# Patient Record
Sex: MALE | Race: WHITE | NOT HISPANIC OR LATINO | Employment: FULL TIME | URBAN - METROPOLITAN AREA
[De-identification: names, ages, dates, MRNs, and addresses within clinical notes are randomized per-mention and may not be internally consistent; named-entity substitution may affect disease eponyms.]

---

## 2020-07-10 ENCOUNTER — OFFICE VISIT (OUTPATIENT)
Dept: UROLOGY | Facility: CLINIC | Age: 55
End: 2020-07-10
Payer: COMMERCIAL

## 2020-07-10 VITALS
HEIGHT: 69 IN | SYSTOLIC BLOOD PRESSURE: 147 MMHG | DIASTOLIC BLOOD PRESSURE: 89 MMHG | HEART RATE: 72 BPM | BODY MASS INDEX: 25.14 KG/M2 | WEIGHT: 169.75 LBS

## 2020-07-10 DIAGNOSIS — N28.1 ACQUIRED RENAL CYST: Primary | ICD-10-CM

## 2020-07-10 PROCEDURE — 99203 OFFICE O/P NEW LOW 30 MIN: CPT | Mod: S$GLB,,, | Performed by: UROLOGY

## 2020-07-10 PROCEDURE — 99999 PR PBB SHADOW E&M-EST. PATIENT-LVL III: CPT | Mod: PBBFAC,,, | Performed by: UROLOGY

## 2020-07-10 PROCEDURE — 3008F PR BODY MASS INDEX (BMI) DOCUMENTED: ICD-10-PCS | Mod: CPTII,S$GLB,, | Performed by: UROLOGY

## 2020-07-10 PROCEDURE — 99999 PR PBB SHADOW E&M-EST. PATIENT-LVL III: ICD-10-PCS | Mod: PBBFAC,,, | Performed by: UROLOGY

## 2020-07-10 PROCEDURE — 3008F BODY MASS INDEX DOCD: CPT | Mod: CPTII,S$GLB,, | Performed by: UROLOGY

## 2020-07-10 PROCEDURE — 99203 PR OFFICE/OUTPT VISIT, NEW, LEVL III, 30-44 MIN: ICD-10-PCS | Mod: S$GLB,,, | Performed by: UROLOGY

## 2020-07-10 NOTE — PROGRESS NOTES
Subjective:       Patient ID: Dawit Rizo is a 55 y.o. male.    Chief Complaint: No chief complaint on file.    HPI     55-year-old here for evaluation of a renal cyst.  He had a recent abdominal ultrasound due to epigastric pain.  The ultrasound showed a small benign appearing cyst on the right kidney measuring about 2-1/2 cm.  He says this cyst has been demonstrated on previous imaging and has been mostly unchanged.  He has no flank pain.  He denies any history of kidney stones or kidney infections.    PSA, SCREEN (ng/mL)   Date Value   06/10/2020 1.1       Review of Systems   Constitutional: Negative for fever.   Eyes: Negative for visual disturbance.   Respiratory: Negative for shortness of breath.    Cardiovascular: Negative for chest pain.   Gastrointestinal: Negative for nausea.   Genitourinary: Negative for dysuria and hematuria.   Musculoskeletal: Negative for gait problem.   Skin: Negative for rash.   Neurological: Negative for seizures.   Psychiatric/Behavioral: Negative for confusion.       Objective:      Physical Exam  Vitals signs reviewed.   Constitutional:       Appearance: He is well-developed.   HENT:      Head: Normocephalic and atraumatic.   Eyes:      Conjunctiva/sclera: Conjunctivae normal.   Cardiovascular:      Rate and Rhythm: Normal rate.   Pulmonary:      Effort: Pulmonary effort is normal.   Abdominal:      General: There is no distension.      Tenderness: There is no right CVA tenderness or left CVA tenderness.   Musculoskeletal: Normal range of motion.   Skin:     General: Skin is warm and dry.      Findings: No rash.   Neurological:      Mental Status: He is alert and oriented to person, place, and time.         Assessment:       1. Acquired renal cyst        Plan:       Acquired renal cyst      reassurance.  He has a benign renal cyst.  No treatment recommended.  Follow-up as needed

## 2020-07-10 NOTE — LETTER
July 10, 2020      Rubin Arana MD  [address]  [facility] [address]           [place] [facility]  [address] [facility] [address]  Phone: [phone]  Fax: [phone]          Patient: Dawit Rizo   MR Number: [id number]   YOB: 1965   Date of Visit: 7/10/2020       Dear Dr. Rubin Arana:    Thank you for referring Dawit Rizo to me for evaluation. Attached you will find relevant portions of my assessment and plan of care.    If you have questions, please do not hesitate to call me. I look forward to following Dawit Rizo along with you.    Sincerely,    KOKI Pepe MD    Enclosure  CC:  No Recipients    If you would like to receive this communication electronically, please contact [email or web address] or [phone] to request more information on [employer] Link access.    For providers and/or their staff who would like to refer a patient to [facility], please contact us through our one-stop-shop provider referral line, [facility], at [phone].    If you feel you have received this communication in error or would no longer like to receive these types of communications, please e-mail [email or web address]

## 2022-01-18 ENCOUNTER — PATIENT MESSAGE (OUTPATIENT)
Dept: PSYCHIATRY | Facility: CLINIC | Age: 57
End: 2022-01-18
Payer: COMMERCIAL

## 2022-01-21 ENCOUNTER — PATIENT MESSAGE (OUTPATIENT)
Dept: PSYCHIATRY | Facility: CLINIC | Age: 57
End: 2022-01-21
Payer: COMMERCIAL

## 2022-01-24 ENCOUNTER — OFFICE VISIT (OUTPATIENT)
Dept: PSYCHIATRY | Facility: CLINIC | Age: 57
End: 2022-01-24
Payer: COMMERCIAL

## 2022-01-24 ENCOUNTER — PATIENT MESSAGE (OUTPATIENT)
Dept: PSYCHIATRY | Facility: CLINIC | Age: 57
End: 2022-01-24

## 2022-01-24 DIAGNOSIS — F41.1 GAD (GENERALIZED ANXIETY DISORDER): Primary | ICD-10-CM

## 2022-01-24 PROCEDURE — 90791 PR PSYCHIATRIC DIAGNOSTIC EVALUATION: ICD-10-PCS | Mod: 95,,, | Performed by: CASE MANAGER/CARE COORDINATOR

## 2022-01-24 PROCEDURE — 4010F PR ACE/ARB THEARPY RXD/TAKEN: ICD-10-PCS | Mod: CPTII,95,, | Performed by: CASE MANAGER/CARE COORDINATOR

## 2022-01-24 PROCEDURE — 3044F HG A1C LEVEL LT 7.0%: CPT | Mod: CPTII,95,, | Performed by: CASE MANAGER/CARE COORDINATOR

## 2022-01-24 PROCEDURE — 4010F ACE/ARB THERAPY RXD/TAKEN: CPT | Mod: CPTII,95,, | Performed by: CASE MANAGER/CARE COORDINATOR

## 2022-01-24 PROCEDURE — 90791 PSYCH DIAGNOSTIC EVALUATION: CPT | Mod: 95,,, | Performed by: CASE MANAGER/CARE COORDINATOR

## 2022-01-24 PROCEDURE — 3044F PR MOST RECENT HEMOGLOBIN A1C LEVEL <7.0%: ICD-10-PCS | Mod: CPTII,95,, | Performed by: CASE MANAGER/CARE COORDINATOR
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
## 2022-02-14 ENCOUNTER — OFFICE VISIT (OUTPATIENT)
Dept: PSYCHIATRY | Facility: CLINIC | Age: 57
End: 2022-02-14
Payer: COMMERCIAL

## 2022-02-14 DIAGNOSIS — F32.89 OTHER DEPRESSION: ICD-10-CM

## 2022-02-14 DIAGNOSIS — F41.1 GAD (GENERALIZED ANXIETY DISORDER): Primary | ICD-10-CM

## 2022-02-14 PROCEDURE — 4010F ACE/ARB THERAPY RXD/TAKEN: CPT | Mod: CPTII,95,, | Performed by: CASE MANAGER/CARE COORDINATOR

## 2022-02-14 PROCEDURE — 3044F HG A1C LEVEL LT 7.0%: CPT | Mod: CPTII,95,, | Performed by: CASE MANAGER/CARE COORDINATOR

## 2022-02-14 PROCEDURE — 90834 PSYTX W PT 45 MINUTES: CPT | Mod: 95,,, | Performed by: CASE MANAGER/CARE COORDINATOR

## 2022-02-14 PROCEDURE — 90834 PR PSYCHOTHERAPY W/PATIENT, 45 MIN: ICD-10-PCS | Mod: 95,,, | Performed by: CASE MANAGER/CARE COORDINATOR

## 2022-02-14 PROCEDURE — 3044F PR MOST RECENT HEMOGLOBIN A1C LEVEL <7.0%: ICD-10-PCS | Mod: CPTII,95,, | Performed by: CASE MANAGER/CARE COORDINATOR

## 2022-02-14 PROCEDURE — 4010F PR ACE/ARB THEARPY RXD/TAKEN: ICD-10-PCS | Mod: CPTII,95,, | Performed by: CASE MANAGER/CARE COORDINATOR

## 2022-02-14 NOTE — PROGRESS NOTES
"Primary Care Behavioral Health: Follow-up  CPT Code: 35019  Patient Name: Dawit Rizo  Date:  2/14/2022   Site:  [facility]  Referral source: Marcela Palacios NP    The patient location is:  [address] [place] [address]   [place] [address]  The patient location [place] is: [facility]  The patient phone number is: [phone]  Visit type: Virtual visit with synchronous audio and video  Each patient to whom he or she provides medical services by telemedicine is:  (1) informed of the relationship between the provider and patient and the respective role of any other health care provider with respect to management of the patient; and (2) notified that he or she may decline to receive medical services by telemedicine and may withdraw from such care at any time.    Chief complaint/reason for encounter: anxiety and depression     History of present illness:  Mr. Dawit Rizo is a 56 y.o. Not  or /a male referred by Marcela Palacios NP.  Patient was seen, examined and chart was reviewed.  Dawit Rizo reviewed and agreed to informed consent and the limits of confidentiality.  Patient shared that his son committed suicide in 2014. Patient noted that he has felt "stuck in the past" at times. He noted symptoms of anxiety and depression including decreased self-esteem and racing thoughts. He stated his symptoms are worse when he is at home. He acknowledged that he had not been exercising as recommended. He also acknowledged that he practiced the progressive muscle relaxation exercise only couple of days since last session. Patient acknowledged that he has had passive suicidal thoughts but denied any plan or intent. He denied any current suicidal thoughts. He acknowledged that he would call 911 or present to the ED if he had suicidal thoughts in the future. Patient shared that much of his stress is related to work and his mortgage payments. Patient noted that he attempted to look at " family pictures but stated it does not help as looking at pictures of his son makes him sad. He did state that he had a good day when he drove into [employer] and got to see some people he used to work with. He also stated he believes his prescription of Wellbutrin is helping decrease his depression.      Past Medical History:   Diagnosis Date    Chicken pox     Depression     GERD (gastroesophageal reflux disease)          Current Outpatient Medications:     ascorbic acid, vitamin C, (VITAMIN C) 100 MG tablet, Take 100 mg by mouth once daily., Disp: , Rfl:     buPROPion (WELLBUTRIN XL) 150 MG TB24 tablet, Take 1 tablet (150 mg total) by mouth once daily., Disp: 30 tablet, Rfl: 11    fluticasone propionate (FLONASE) 50 mcg/actuation nasal spray, 1 spray (50 mcg total) by Each Nostril route once daily., Disp: 11.1 mL, Rfl: 3    lisinopriL 10 MG tablet, Take 1 tablet (10 mg total) by mouth once daily., Disp: 90 tablet, Rfl: 3    loratadine 10 mg Cap, once daily., Disp: , Rfl:     metoprolol tartrate (LOPRESSOR) 25 MG tablet, Take 1 tablet (25 mg total) by mouth 2 (two) times daily. Take one pill the night prior to your CTA and the second pill the morning of your CTA for 2 doses, Disp: 2 tablet, Rfl: 0    multivitamin (THERAGRAN) per tablet, Take 1 tablet by mouth once daily., Disp: , Rfl:     mupirocin (BACTROBAN) 2 % ointment, APPLY SMALL AMOUNT TOPICALLY TO THE AFFECTED AREA TWICE DAILY FOR 5 TO 7 DAYS AS NEEDED, Disp: , Rfl:     pantoprazole (PROTONIX) 40 MG tablet, TK 1 T PO QD B A MEAL FOR 30 DAYS., Disp: , Rfl:     rosuvastatin (CRESTOR) 10 MG tablet, Take 1 tablet (10 mg total) by mouth once daily., Disp: 90 tablet, Rfl: 3    UNABLE TO FIND, Moringa powder (Multivitamins), Disp: , Rfl:     vitamin E 100 UNIT capsule, Take 100 Units by mouth once daily., Disp: , Rfl:       Psychiatric symptoms:  Depression:  Patient reported that he felt sad for at least half of the day 7 days in the past two weeks;  "also reported decreased self-esteem and feeling at times "stuck in the past"  Suma/Hypomania:  Denied.  He denied periods of elevated mood or abnormally increased energy or goal-directed activity.  Anxiety:  Reported stress about work and paying his home mortgage. Racing thoughts. Felt anxious 14 out of last 14 days  Thoughts:  Denied delusions, hallucinations.  Suicidal thoughts/behaviors:  Patient denied suicidal and homicidal ideation, plan and intent.  Patient noted agreement to call 911/and or present to the ED if he experienced suicidal or homicidal ideation, plan or intent. He acknowledge some previous passive suicidal thoughts. He noted that these were brief and that he never had a plan or intent.  Self-injury:  Denied.  Sleep: Patient noted that he has no difficulty falling asleep but acknowledged that he has a couple glass of wine in the evenings; acknowledged he wants to decrease this; reportedly sleeps 7 hours per night  Trauma: Son committed suicide in 2014      Mental Status Exam:  General appearance:  appears stated age, neatly dressed, well groomed  Speech:  normal rate, normal tone, normal pitch, normal volume  Level of cooperation:  cooperative  Thought processes:  logical, goal-directed  Mood:  Anxious  Thought content:  no illusions, no visual hallucinations, no auditory hallucinations, no delusions, no active or passive homicidal thoughts, no active or passive suicidal ideation at this time, no obsessions, no compulsions, no violence  Affect:  Appropriate and mood congruent; appeared anxious at times during the session  Orientation:  oriented to person, place, situation and date  Memory/Attention and Concentration:  No gross deficits made evident during conversation.  Judgment and insight: fair  Language:  intact    Over the last 2 weeks, how often have you been bothered by any of the following problems?  Little interest or pleasure in doing things: More than half the days  Feeling down, " depressed, or hopeless: More than half the days  Trouble falling or staying asleep, or sleeping too much: Several days  Feeling tired or having little energy: More than half the days  Poor appetite or overeating: Not at all  Feeling bad about yourself - or that you are a failure or have let yourself or your family down: Nearly every day  Trouble concentrating on things, such as reading the newspaper or watching television: Several days  Moving or speaking so slowly that other people could have noticed. Or the opposite - being so fidgety or restless that you have been moving around a lot more than usual: Not at all  Thoughts that you would be better off dead, or of hurting yourself in some way: More than half the days  PHQ-9 Total Score: 13  If you checked off any problems, how difficult have these problems made it for you to do your work, take care of things at home, or get along with other people?: Very difficult    Past PHQ-9 Score: 17 (1/24/2022)    GAD7 2/14/2022 1/24/2022   1. Feeling nervous, anxious, or on edge? 3 3   2. Not being able to stop or control worrying? 3 3   3. Worrying too much about different things? 3 3   4. Trouble relaxing? 3 3   5. Being so restless that it is hard to sit still? 2 2   6. Becoming easily annoyed or irritable? 1 1   7. Feeling afraid as if something awful might happen? 2 2   8. If you checked off any problems, how difficult have these problems made it for you to do your work, take care of things at home, or get along with other people? 2 2   SHANITA-7 Score 17 17       Impression:    Patient appears to present with both symptoms of depression and anxiety. It appears that his current anxiety focuses on thoughts about work and his house. Discussed practicing skills at home such as progressive muscle relaxation. Patient stated he would work on doing this more. Patient also shared that he would put together a daily schedule for himself including time to exercise. Discussed the  cognitive triad with patient including how taking part in pleasant events can help change thoughts and feelings to more positive ones. Patient verbally was able to reiterate the concept suggesting he understood it. Patient was recommended to include some pleasant activities into his schedule. Discussed with patient referral to long term psychotherapy. Patient agreed that this would be beneficial for him. Patient also appears to still have difficulties with depression since his son's death. Patient would benefit from processing this trauma and continue building appropriate coping skills.    Diagnosis:    1. SHANITA (generalized anxiety disorder)     2. Other depression          Plan:      1) Pleasant event scheduling  2) Create exercise routine  3) Practice progressive muscle relaxation  4) Referral made for psychotherapy    Length of Appointment: 50 minutes        Laura Dixon License #[id number]

## 2022-04-06 ENCOUNTER — PATIENT MESSAGE (OUTPATIENT)
Dept: PSYCHIATRY | Facility: CLINIC | Age: 57
End: 2022-04-06
Payer: COMMERCIAL

## 2022-08-04 PROBLEM — E78.00 HYPERCHOLESTEROLEMIA: Status: ACTIVE | Noted: 2022-08-04
